# Patient Record
Sex: MALE | Employment: UNEMPLOYED | URBAN - METROPOLITAN AREA
[De-identification: names, ages, dates, MRNs, and addresses within clinical notes are randomized per-mention and may not be internally consistent; named-entity substitution may affect disease eponyms.]

---

## 2022-11-12 ENCOUNTER — HOSPITAL ENCOUNTER (EMERGENCY)
Age: 27
Discharge: HOME | End: 2022-11-12
Attending: EMERGENCY MEDICINE
Payer: MEDICAID

## 2022-11-12 VITALS
WEIGHT: 190 LBS | RESPIRATION RATE: 21 BRPM | SYSTOLIC BLOOD PRESSURE: 139 MMHG | OXYGEN SATURATION: 96 % | TEMPERATURE: 99.1 F | DIASTOLIC BLOOD PRESSURE: 88 MMHG | HEIGHT: 67 IN | BODY MASS INDEX: 29.82 KG/M2 | HEART RATE: 96 BPM

## 2022-11-12 DIAGNOSIS — S41.112A LACERATION OF LEFT UPPER EXTREMITY WITH COMPLICATION, INITIAL ENCOUNTER: Primary | ICD-10-CM

## 2022-11-12 PROCEDURE — 6370000000 HC RX 637 (ALT 250 FOR IP)

## 2022-11-12 PROCEDURE — 99284 EMERGENCY DEPT VISIT MOD MDM: CPT

## 2022-11-12 PROCEDURE — 90471 IMMUNIZATION ADMIN: CPT

## 2022-11-12 PROCEDURE — 90715 TDAP VACCINE 7 YRS/> IM: CPT

## 2022-11-12 PROCEDURE — 6360000002 HC RX W HCPCS

## 2022-11-12 RX ORDER — ACETAMINOPHEN 500 MG
1000 TABLET ORAL ONCE
Status: COMPLETED | OUTPATIENT
Start: 2022-11-12 | End: 2022-11-12

## 2022-11-12 RX ORDER — DOXYCYCLINE HYCLATE 100 MG
100 TABLET ORAL 2 TIMES DAILY
Qty: 14 TABLET | Refills: 0 | Status: SHIPPED | OUTPATIENT
Start: 2022-11-12 | End: 2022-11-12 | Stop reason: SDUPTHER

## 2022-11-12 RX ORDER — DOXYCYCLINE HYCLATE 100 MG
100 TABLET ORAL 2 TIMES DAILY
Qty: 20 TABLET | Refills: 0 | Status: SHIPPED | OUTPATIENT
Start: 2022-11-12 | End: 2022-11-22

## 2022-11-12 RX ORDER — MUPIROCIN CALCIUM 20 MG/G
CREAM TOPICAL
Qty: 1 EACH | Refills: 0 | Status: SHIPPED | OUTPATIENT
Start: 2022-11-12 | End: 2022-11-12

## 2022-11-12 RX ADMIN — TETANUS TOXOID, REDUCED DIPHTHERIA TOXOID AND ACELLULAR PERTUSSIS VACCINE, ADSORBED 0.5 ML: 5; 2.5; 8; 8; 2.5 SUSPENSION INTRAMUSCULAR at 19:37

## 2022-11-12 RX ADMIN — ACETAMINOPHEN 1000 MG: 500 TABLET ORAL at 19:17

## 2022-11-12 ASSESSMENT — ENCOUNTER SYMPTOMS
APNEA: 0
ABDOMINAL DISTENTION: 0
ABDOMINAL PAIN: 0
NAUSEA: 0

## 2022-11-12 ASSESSMENT — PAIN - FUNCTIONAL ASSESSMENT
PAIN_FUNCTIONAL_ASSESSMENT: NONE - DENIES PAIN
PAIN_FUNCTIONAL_ASSESSMENT: NONE - DENIES PAIN

## 2022-11-12 NOTE — ED NOTES
Pt to ED complaining of pain on the lacerated wound at left upper arm 3 days PTA. Pt states he just wanted to get check and want to make sure that it's not infected. Denies fever and chills,  Denies any medical history at this time. VS taken and recorded. All needs attended will continue plan of care.      Marla Cuba RN  11/12/22 [id number]

## 2022-11-13 NOTE — ED PROVIDER NOTES
[address] [facility]     Emergency Department     Faculty Attestation    I performed a history and physical examination of the patient and discussed management with the resident. I reviewed the residents note and agree with the documented findings and plan of care. Any areas of disagreement are noted on the chart. I was personally present for the key portions of any procedures. I have documented in the chart those procedures where I was not present during the key portions. I have reviewed the emergency nurses triage note. I agree with the chief complaint, past medical history, past surgical history, allergies, medications, social and family history as documented unless otherwise noted below. For Physician Assistant/ Nurse Practitioner cases/documentation I have personally evaluated this patient and have completed at least one if not all key elements of the E/M (history, physical exam, and MDM). Additional findings are as noted. I have personally seen and evaluated the patient. I find the patient's history and physical exam are consistent with the NP/PA documentation. I agree with the care provided, treatment rendered, disposition and follow-up plan. Superficial lacerations noted to the left arm from a  injury approximately 2 days ago exudate within them no gross evidence of cellulitis at this point however empiric antibiotics indicated for close follow-up advised      Critical Care     Blake Lewis M.D.   Attending Emergency  Physician            Margarita Krishna MD  11/12/22 2190

## 2022-11-13 NOTE — ED PROVIDER NOTES
[facility]  ED  Emergency Department Encounter  Non Emergency Medicine Resident     Pt Name: Keri Almendarez  MRN: [id number]  [place] 1995  Date of evaluation: 11/12/22  PCP:  No primary care provider on file. CHIEF COMPLAINT       Chief Complaint   Patient presents with    Laceration     WOUND LACERATION LEFT UPPER ARM 2 DAYS PTA       HISTORY OF PRESENT ILLNESS  (Location/Symptom, Timing/Onset, Context/Setting,Quality, Duration, Modifying Factors, Severity.)      Keri Almendarez is a 32 y.o. male who presents with left upper arm laceration that occurred 2 days ago. Patient states that he had a  coupled in his arm and was trying to put on glasses while holding the . The  was activated during this time and it cut his left upper arm. Patient states that he has been putting bacitracin on the wound at home, but is concerned that the wound is infected. The patient is not up-to-date on his tetanus. The patient denies fevers, chills, fatigue or diaphoresis. PAST MEDICAL / SURGICAL /SOCIAL / FAMILY HISTORY      has no past medical history on file. has no past surgical history on file.     Social History     Socioeconomic History    Marital status:      Spouse name: Not on file    Number of children: Not on file    Years of education: Not on file    Highest education level: Not on file   Occupational History    Not on file   Tobacco Use    Smoking status: Not on file    Smokeless tobacco: Not on file   Substance and Sexual Activity    Alcohol use: Not on file    Drug use: Not on file    Sexual activity: Not on file   Other Topics Concern    Not on file   Social History Narrative    Not on file     Social Determinants of Health     Financial Resource Strain: Not on file   Food Insecurity: Not on file   Transportation Needs: Not on file   Physical Activity: Not on file   Stress: Not on file   Social Connections: Not on file   Intimate Partner Violence: Not on file   Housing Stability: Not on file       History reviewed. No pertinent family history. Allergies:  Patient has no known allergies. Home Medications:  Prior to Admission medications    Medication Sig Start Date End Date Taking? Authorizing Provider   doxycycline hyclate (VIBRA-TABS) 100 MG tablet Take 1 tablet by mouth 2 times daily for 10 days 11/12/22 11/22/22 Yes Steph Gonzalez MD   pirocin [facility]) 2 % ointment Apply topically 3 times daily. 11/12/22 11/19/22 Yes Steph Gonzalez MD       REVIEW OF SYSTEMS    (2-9 systems for level 4, 10 or more for level 5)      Review of Systems   Constitutional:  Negative for chills, diaphoresis, fatigue and fever. HENT:  Negative for congestion. Eyes:  Negative for visual disturbance. Respiratory:  Negative for apnea. Cardiovascular:  Negative for chest pain. Gastrointestinal:  Negative for abdominal distention, abdominal pain and nausea. Musculoskeletal:  Negative for arthralgias. Skin:  Positive for wound (Patient has laceration approximately 12 cm which bifurcates on the left upper arm.). Allergic/Immunologic: Negative for immunocompromised state. Neurological:  Negative for light-headedness, numbness and headaches. Hematological:  Negative for adenopathy. Psychiatric/Behavioral:  Negative for agitation. PHYSICAL EXAM   (up to 7for level 4, 8 or more for level 5)      INITIAL VITALS:   /88   Pulse 96   Temp 99.1 °F (37.3 °C) (Oral)   Resp 21   Ht 5' 7\" (1.702 m)   Wt 190 lb (86.2 kg)   SpO2 96%   BMI 29.76 kg/m²     Physical Exam  Constitutional:       General: He is not in acute distress. Appearance: He is not diaphoretic. HENT:      Head: Normocephalic and atraumatic. Right Ear: External ear normal.      Left Ear: External ear normal.      Nose: No congestion. Mouth/Throat:      Mouth: Mucous membranes are moist.      Pharynx: Oropharynx is clear.    Eyes:      Extraocular Movements: Extraocular movements intact. Conjunctiva/sclera: Conjunctivae normal.   Cardiovascular:      Rate and Rhythm: Normal rate and regular rhythm. Pulses: Normal pulses. Heart sounds: Normal heart sounds. Pulmonary:      Effort: Pulmonary effort is normal.      Breath sounds: Normal breath sounds. Abdominal:      General: Bowel sounds are normal.      Palpations: Abdomen is soft. Musculoskeletal:         General: Signs of injury (Patient has laceration on left upper arm approximately 12 cm in length that bifurcates. There is erythema surrounding it, there is no drainage associated with the wound.) present. No swelling. Cervical back: No rigidity. Right lower leg: No edema. Left lower leg: No edema. Skin:     Findings: Lesion present. Neurological:      Mental Status: He is alert and oriented to person, place, and time. Psychiatric:         Mood and Affect: Mood normal.       DIFFERENTIAL  DIAGNOSIS     PLAN (LABS / IMAGING / EKG):  No orders of the defined types were placed in this encounter. MEDICATIONSORDERED:  Orders Placed This Encounter   Medications    Tetanus-Diphth-Acell Pertussis (BOOSTRIX) injection 0.5 mL    acetaminophen (TYLENOL) tablet 1,000 mg    DISCONTD: mupirocin (BACTROBAN) 2 % cream     Sig: Apply topically 3 times daily. Dispense:  1 each     Refill:  0    DISCONTD: doxycycline hyclate (VIBRA-TABS) 100 MG tablet     Sig: Take 1 tablet by mouth 2 times daily for 7 days     Dispense:  14 tablet     Refill:  0    doxycycline hyclate (VIBRA-TABS) 100 MG tablet     Sig: Take 1 tablet by mouth 2 times daily for 10 days     Dispense:  20 tablet     Refill:  0    mupirocin (BACTROBAN) 2 % ointment     Sig: Apply topically 3 times daily. Dispense:  1 each     Refill:  0       DDX: Left upper arm laceration, complicated with local infection.     DIAGNOSTIC RESULTS / EMERGENCY DEPARTMENT COURSE / MDM     LABS:  No results found for this visit on 11/12/22. IMPRESSION: 26-year-old male who presents to the emergency department after left arm laceration with . This was an accidental occurrence. Patient states that this happened 2 days ago while at work as a . Patient has been treating the wound with bacitracin however feels like it is infected. Patient is not up-to-date on his tetanus. RADIOLOGY:  None    EKG  None    All EKG's are interpreted by the Emergency Department Physician who either signs or Co-signsthis chart in the absence of a cardiologist.    EMERGENCY DEPARTMENT COURSE:  Patient was evaluated in the emergency department by the emergency medicine staff for a laceration on his left upper arm. Laceration is approximately 12 cm in length, erythematous, wound is not draining. Patient would not have initially needed sutures for this laceration and is now out of the window regardless. Wound depth is approximately 2 mm at the deepest point. Patient received Tdap  Patient received Tylenol for pain control  Patient wound was cleaned with peroxide in the emergency department. Patient will be discharged on Bactroban and doxycycline. PROCEDURES:  None    CONSULTS:  None    CRITICAL CARE:  None    FINAL IMPRESSION      1. Laceration of left upper extremity with complication, initial encounter          DISPOSITION / PLAN     DISPOSITION Decision To Discharge 11/12/2022 07:26:53 PM      PATIENT REFERRED TO:  [facility] ED  [address] [facility] [address] [facility]  [phone]  Go to   If symptoms worsen please return to the emergency department. [address]  [address]  [phone]  Schedule an appointment as soon as possible for a visit in 3 days  Please see a primary care physician within the next 3 days to follow-up after emergency medicine visit.       DISCHARGE MEDICATIONS:  New Prescriptions    DOXYCYCLINE HYCLATE (VIBRA-TABS) 100 MG TABLET    Take 1 tablet by mouth 2 times daily for 10 days    MUPIROCIN (BACTROBAN) 2 % OINTMENT    Apply topically 3 times daily.        Scott Oneill MD  [facility]  Non-emergency medicine resident       Scott Oneill MD  Resident  11/12/22 Christofer Conway MD  Resident  11/12/22 1325 Statement Selected